# Patient Record
(demographics unavailable — no encounter records)

---

## 2025-01-04 NOTE — HISTORY OF PRESENT ILLNESS
[Never] : never [TextBox_4] : 28 year old woman who developed pneumonia due to COVID. Hospitalized at [facility] and required ET intubation [facility] ventilation She has had bacterial pneumonia in past Chart reviewed.   CT chest did not show significant iniltrates  She is breathing well.  However, she does note cough when she laughs  She did have similar symptoms prior to COVID-19 infection   She has no prior  history of obstructive airway disease   She is now normally active   She  completed prednisone. She uses inhaled bronchodilator and benzonatate  Admitted 1/30/24 discharged 1/04/24.   PMH is negative except for prior pneumonia. She has been given inhaled bronchodilator in past.  SH Non smoker    Primary doctor is Dr Bazzi Nurse     PSH:  none     PMH: sinus infections  seasonal allergy      SH:   never smoker      ETOH:  occasional     Occupation: [employer]   No exposure to chemicals, dust, asbestos, mold, pets        ALLERGY:   NKDA     environmental/seasonal allergy:  yes     Review of Systems:   No rash, skin problems No dry eyes no mouth ulcers  no dysphagia no dry mouth   no arthritis no joint aches no joint swelling     no pneumonia no wheeze no lung cancer   no CAD no MI no chest pain no murmur no CHF no HTN no edema   no peptic ulcer or gastritis no GERD no abdominal pain no liver disease   no Diabetes no thyroid disease no hyperlipidemia     no bleeding   no DVT or PE   no kidney disease   no stroke no seizure

## 2025-01-04 NOTE — DISCUSSION/SUMMARY
[FreeTextEntry1] : 28 year old woman who developed pneumonia due to COVID. Hospitalized at [facility] and required ET intubation [facility] ventilation She has had bacterial pneumonia in past  CT chest demonstrated bronchial secretions but no infiltrates  No prior obstructive airway disease   She used Symbicort 160/4.5 2 puffs twice per day   It is unclear why she developed respiratory failure  It appears she may have asthma  She used Symbicort 160/4.5 2 puffs once  per day.  she continued to have symptoms so she started using it at night.  She is using Symbicort as needed now.  She feels she is much improved  PLAN  May use Symbicort 2 puffs once to twice per day and observe  use albuterol MDI as needed  Has some seasonal allergy trees ragweed, dog, wray advance activity   Total time spent : 30 minutes Including: Preparation prior to visit - Reviewing prior record, results of tests and Consultation Reports as applicable Conducting an appropriate H & P during today's encounter Appropriate orders for tests, medications and procedures, as applicable Counseling patient  Note completion   Varinder Lowe MD

## 2025-01-04 NOTE — PROCEDURE
[FreeTextEntry1] : PFT  normal spirometry, lung volumes and diffusion  there is positive inhaled bronchodilator response consistent with underlying obstructive airway disease     [facility]   CT ANGIO CHEST PULM ART [facility] ORDERED BY: KARL CHAUDHRY  PROCEDURE DATE: 01/30/2024    INTERPRETATION: EXAMINATION: CT ANGIO CHEST PULMONARY ARTERY  CLINICAL INDICATION: Dyspnea  TECHNIQUE: CTA of the chest was performed after the administration of 70 cc administered of Omnipaque 350, 30 cc discarded. MIP images were reconstructed.  COMPARISON: None.  FINDINGS:  PULMONARY EMBOLISM: No pulmonary embolism..  AIRWAYS AND LUNGS: Endotracheal tube tip mid trachea. Scattered bronchial secretions. Subtle clustered nodules left lower lobe opacities.  MEDIASTINUM AND PLEURA: There are no enlarged mediastinal, hilar or axillary lymph nodes. The visualized portion of the thyroid gland is unremarkable. There is no pleural effusion. There is no pneumothorax.  HEART AND VESSELS: There is mild cardiomegaly. There are no atherosclerotic calcifications of the aorta. There is no pericardial effusion.  UPPER ABDOMEN: Images of the upper abdomen demonstrate enteric tube sidehole in stomach.  BONES AND SOFT TISSUES: The bones are unremarkable. The soft tissues are unremarkable.   IMPRESSION: No pulmonary embolism.  Scattered bronchial secretions with left lower lobe mucoid impaction.  --- End of Report ---

## 2025-01-04 NOTE — PHYSICAL EXAM
[No Acute Distress] : no acute distress [Well Nourished] : well nourished [Enlarged Base of the Tongue] : enlarged base of the tongue [Normal Appearance] : normal appearance [No Neck Mass] : no neck mass [Normal Rate/Rhythm] : normal rate/rhythm [Normal S1, S2] : normal s1, s2 [No Murmurs] : no murmurs [No Resp Distress] : no resp distress [Benign] : benign [Soft] : soft [No Clubbing] : no clubbing [No Focal Deficits] : no focal deficits